# Patient Record
Sex: MALE | Race: WHITE | Employment: FULL TIME | URBAN - METROPOLITAN AREA
[De-identification: names, ages, dates, MRNs, and addresses within clinical notes are randomized per-mention and may not be internally consistent; named-entity substitution may affect disease eponyms.]

---

## 2019-05-22 ENCOUNTER — APPOINTMENT (OUTPATIENT)
Dept: GENERAL RADIOLOGY | Age: 49
End: 2019-05-22

## 2019-05-22 ENCOUNTER — HOSPITAL ENCOUNTER (EMERGENCY)
Age: 49
Discharge: HOME | End: 2019-05-23
Attending: EMERGENCY MEDICINE

## 2019-05-22 VITALS
DIASTOLIC BLOOD PRESSURE: 79 MMHG | WEIGHT: 150 LBS | OXYGEN SATURATION: 97 % | TEMPERATURE: 98 F | HEART RATE: 87 BPM | RESPIRATION RATE: 18 BRPM | HEIGHT: 67 IN | BODY MASS INDEX: 23.54 KG/M2 | SYSTOLIC BLOOD PRESSURE: 127 MMHG

## 2019-05-22 DIAGNOSIS — M25.561 ACUTE PAIN OF RIGHT KNEE: ICD-10-CM

## 2019-05-22 DIAGNOSIS — S81.811A LACERATION OF RIGHT LOWER EXTREMITY, INITIAL ENCOUNTER: Primary | ICD-10-CM

## 2019-05-22 PROCEDURE — 73562 X-RAY EXAM OF KNEE 3: CPT

## 2019-05-22 PROCEDURE — 6360000002 HC RX W HCPCS: Performed by: NURSE PRACTITIONER

## 2019-05-22 PROCEDURE — 90715 TDAP VACCINE 7 YRS/> IM: CPT | Performed by: NURSE PRACTITIONER

## 2019-05-22 PROCEDURE — 99283 EMERGENCY DEPT VISIT LOW MDM: CPT

## 2019-05-22 PROCEDURE — 90471 IMMUNIZATION ADMIN: CPT | Performed by: NURSE PRACTITIONER

## 2019-05-22 RX ADMIN — TETANUS TOXOID, REDUCED DIPHTHERIA TOXOID AND ACELLULAR PERTUSSIS VACCINE, ADSORBED 0.5 ML: 5; 2.5; 8; 8; 2.5 SUSPENSION INTRAMUSCULAR at 23:00

## 2019-05-22 ASSESSMENT — PAIN SCALES - GENERAL: PAINLEVEL_OUTOF10: 10

## 2019-05-23 PROCEDURE — 6370000000 HC RX 637 (ALT 250 FOR IP): Performed by: NURSE PRACTITIONER

## 2019-05-23 RX ORDER — HYDROCODONE BITARTRATE AND ACETAMINOPHEN 5; 325 MG/1; MG/1
1 TABLET ORAL EVERY 6 HOURS PRN
Qty: 12 TABLET | Refills: 0 | Status: SHIPPED | OUTPATIENT
Start: 2019-05-23 | End: 2019-05-26

## 2019-05-23 RX ORDER — DOXYCYCLINE 100 MG/1
100 CAPSULE ORAL ONCE
Status: COMPLETED | OUTPATIENT
Start: 2019-05-23 | End: 2019-05-23

## 2019-05-23 RX ORDER — HYDROCODONE BITARTRATE AND ACETAMINOPHEN 5; 325 MG/1; MG/1
1 TABLET ORAL ONCE
Status: COMPLETED | OUTPATIENT
Start: 2019-05-23 | End: 2019-05-23

## 2019-05-23 RX ORDER — DOXYCYCLINE HYCLATE 100 MG/1
100 CAPSULE ORAL 2 TIMES DAILY
Qty: 14 CAPSULE | Refills: 0 | Status: SHIPPED | OUTPATIENT
Start: 2019-05-23 | End: 2019-06-02

## 2019-05-23 RX ADMIN — HYDROCODONE BITARTRATE AND ACETAMINOPHEN 1 TABLET: 5; 325 TABLET ORAL at 00:42

## 2019-05-23 RX ADMIN — DOXYCYCLINE 100 MG: 100 CAPSULE ORAL at 00:42

## 2019-05-23 ASSESSMENT — PAIN SCALES - GENERAL: PAINLEVEL_OUTOF10: 10

## 2019-05-23 NOTE — ED NOTES
Patient education flyer \"[facility] What [facility] to [facility]\" was provided and reviewed. Questions answered and understanding was verbalized by the patient and/or family.        Nikita Soto RN  05/23/19 9865

## 2019-05-23 NOTE — ED NOTES
Crutch teaching complete, pt showed return demonstration. Told pt he needs to keep ice on right knee.       Audra Short RN  05/23/19 5568

## 2019-05-23 NOTE — ED PROVIDER NOTES
[facility] ED  eMERGENCY dEPARTMENT eNCOUnter      Pt Name: Hernesto Saez  MRN: [id number]  [place] 1970  Date of evaluation: 5/22/2019  Provider: GITA Artis [id number]       Chief Complaint   Patient presents with    Leg Injury         HISTORY OF PRESENT ILLNESS  (Location/Symptom, Timing/Onset, Context/Setting, Quality, Duration, Modifying Factors, Severity.)   Hernesto Saez is a 53 y.o. male who presents to the emergency department with pain and a small laceration to the right lateral knee. He states that he was coming down branches using a machete and he struck his right leg. This occurred at 5 PM.  He initially did not think much of the injury. Over the last couple hours the pain is increased and the swelling on the side of his knee has also increased. There is a small amount of swelling which is very tender to palpation. Extension of the leg increases the pain. No alleviating factors. His last tetanus was over 10 years ago. Nursing Notes were reviewed. ALLERGIES     Patient has no known allergies. CURRENT MEDICATIONS       Discharge Medication List as of 5/23/2019 12:30 AM          PAST MEDICAL HISTORY         Diagnosis Date    Chronic back pain        SURGICAL HISTORY           Procedure Laterality Date    KIDNEY REMOVAL Left     donated         FAMILY HISTORY     No family history on file. No family status information on file. SOCIAL HISTORY      reports that he has been smoking cigarettes. He has been smoking about 0.75 packs per day. He has never used smokeless tobacco. He reports that he drinks alcohol. He reports that he has current or past drug history. Drug: Cocaine.     REVIEW OF SYSTEMS    (2-9 systems for level 4, 10 or more for level 5)   Denies chest pain  Denies shortness of breath  Denies fever, chills  Denies tingling or loss of sensation at injury site    Except as noted above the remainder of the review of systems was reviewed and negative. PHYSICAL EXAM    (up to 7 for level 4, 8 or more for level 5)     ED Triage Vitals [05/22/19 2123]   BP Temp Temp src Pulse Resp SpO2 Height Weight   127/79 98 °F (36.7 °C) -- 87 18 97 % 5' 7\" (1.702 m) 150 lb (68 kg)         General Appearance:  Alert, cooperative,    Head:  Normocephalic,     Eyes:  conjunctiva/corneas clear, EOM's intact. ENT: Mucous membranes moist.      Neck: Supple,  trachea midline. Lungs:   Lungs clear to auscultation. Christel Congo Respirations eupneic   Heart: RRR, Normal skin color   Extremities:   Small nonbleeding lacertation to right outer knee, approx 1cm long. . Complains of pain with extension of the leg. Mild swelling around the wound. Mild erythema at the wound site;. No pain with valgus or varus movement. Unable to perform posterior or anterior drawer signs due to pain with flexion. No sensory loss distally. Pulses:  DP/PT pulses 3+/4   Skin: No rashes or lesions to exposed skin   Neurologic: Alert and oriented. Motor grossly normal. Speech clear       DIAGNOSTIC RESULTS     EKG: All EKG's are interpreted by the Emergency Department Physician who either signs or Co-signs this chart in the absence of a cardiologist.    N/A    RADIOLOGY:   Non-plain film images such as CT, Ultrasound and MRI are read by the radiologist. Plain radiographic images are visualized and preliminarily interpreted by the emergency physician with the below findings:         Interpretation per the Radiologist below, if available at the time of this note:    XR KNEE RIGHT (3 VIEWS)   Final Result   1. Unremarkable radiographs of the right knee. LABS:  Labs Reviewed - No data to display    All other labs were within normal range or not returned as of this dictation.     EMERGENCY DEPARTMENT COURSE and DIFFERENTIAL DIAGNOSIS/MDM:   Vitals:    Vitals:    05/22/19 2123   BP: 127/79   Pulse: 87   Resp: 18   Temp: 98 °F (36.7 °C)   SpO2: 97%   Weight: 150 lb (68 kg)   Height: 5' 7\" (1.702 m)       Medical Decision Makinyo male with laceration to the right outer knee. The wound is small and not bleeding. No need for sutures. His tetanus was updated. Xrays were negative. He will be placed on antibiotics and he was given resources to find a PCP. He has been advised to return to the ER if worse. CONSULTS:  None    PROCEDURES:  None    FINAL IMPRESSION      1. Laceration of right lower extremity, initial encounter    2. Acute pain of right knee          DISPOSITION/PLAN   DISPOSITION Decision To Discharge 2019 12:24:14 AM      PATIENT REFERRED TO:   [facility] ED  [address] [facility]  [phone]  Go to   If symptoms worsen, As needed      DISCHARGE MEDICATIONS:     Discharge Medication List as of 2019 12:30 AM      START taking these medications    Details   HYDROcodone-acetaminophen (NORCO) 5-325 MG per tablet Take 1 tablet by mouth every 6 hours as needed for Pain for up to 3 days. Intended supply: 3 days.  Take lowest dose possible to manage pain, Disp-12 tablet, R-0Print      doxycycline hyclate (VIBRAMYCIN) 100 MG capsule Take 1 capsule by mouth 2 times daily for 10 days, Disp-14 capsule, R-0Print                 (Please note that portions of this note were completed with a voice recognition program.  Efforts were made to edit the dictations but occasionally words are mis-transcribed.)    GITA Leal - CNP  Certified Nurse Practitioner          GITA Leal CNP  [id number] GITA Stark CNP  [phone]

## 2021-05-11 ENCOUNTER — HOSPITAL ENCOUNTER (EMERGENCY)
Age: 51
Discharge: HOME | End: 2021-05-11
Attending: EMERGENCY MEDICINE

## 2021-05-11 VITALS
SYSTOLIC BLOOD PRESSURE: 148 MMHG | TEMPERATURE: 98.4 F | BODY MASS INDEX: 25.06 KG/M2 | DIASTOLIC BLOOD PRESSURE: 73 MMHG | HEART RATE: 71 BPM | WEIGHT: 160 LBS | OXYGEN SATURATION: 98 % | RESPIRATION RATE: 18 BRPM

## 2021-05-11 DIAGNOSIS — T15.02XA FOREIGN BODY OF LEFT CORNEA, INITIAL ENCOUNTER: Primary | ICD-10-CM

## 2021-05-11 PROCEDURE — 99284 EMERGENCY DEPT VISIT MOD MDM: CPT

## 2021-05-11 PROCEDURE — 65220 REMOVE FOREIGN BODY FROM EYE: CPT

## 2021-05-11 RX ORDER — ERYTHROMYCIN 5 MG/G
OINTMENT OPHTHALMIC
Qty: 1 TUBE | Refills: 0 | Status: SHIPPED | OUTPATIENT
Start: 2021-05-11

## 2021-05-11 ASSESSMENT — VISUAL ACUITY: OD: 20/40

## 2021-05-11 ASSESSMENT — PAIN DESCRIPTION - DESCRIPTORS: DESCRIPTORS: BURNING;ACHING

## 2021-05-11 ASSESSMENT — PAIN DESCRIPTION - ONSET: ONSET: UNABLE TO TELL

## 2021-05-11 ASSESSMENT — PAIN DESCRIPTION - FREQUENCY
FREQUENCY: INTERMITTENT
FREQUENCY: CONTINUOUS

## 2021-05-11 ASSESSMENT — PAIN SCALES - GENERAL
PAINLEVEL_OUTOF10: 10
PAINLEVEL_OUTOF10: 6

## 2021-05-11 ASSESSMENT — PAIN DESCRIPTION - ORIENTATION: ORIENTATION: LEFT

## 2021-05-12 ASSESSMENT — VISUAL ACUITY: OU: 1

## 2021-05-12 NOTE — ED NOTES
Pt arrives [facility] pain in both eyes. Pt states he was cutting metal tube around noon yesterday when a spark/metal flew between safety glasses and eyes. Pt states he thinks the spark/metal grazed both eyes but states pain is worse in the L eye.       Pansy Severance, RN  05/11/21 2133       Pansy Severance, RN  05/11/21 2136

## 2021-05-12 NOTE — ED PROVIDER NOTES
EMERGENCY DEPARTMENT ENCOUNTER    Pt Name: Augie Cash  MRN: [id number]  [place] 1970  Date of evaluation: 5/12/21  CHIEF COMPLAINT       Chief Complaint   Patient presents with    Eye Injury     left     HISTORY OF PRESENT ILLNESS   Patient is a 67-year-old male who presents the ED complaining of eye pain. Pain started yesterday while eating through metal.  There was dunbar and fragments flying in the air. He believes he has metal fragments in his left eye. No vision changes. No other issues at this time. No fever, cough, shortness of breath, chest pain, abdominal pain. REVIEW OF SYSTEMS     Review of Systems   All other systems reviewed and are negative. PASTMEDICAL HISTORY     Past Medical History:   Diagnosis Date    Chronic back pain      SURGICAL HISTORY       Past Surgical History:   Procedure Laterality Date    KIDNEY REMOVAL Left     donated     CURRENT MEDICATIONS       Discharge Medication List as of 5/11/2021 10:21 PM        ALLERGIES     has No Known Allergies. FAMILY HISTORY     has no family status information on file. SOCIAL HISTORY       Social History     Tobacco Use    Smoking status: Current Every Day Smoker     Packs/day: 0.75     Types: Cigarettes    Smokeless tobacco: Never Used   Substance Use Topics    Alcohol use: Yes     Comment: socially    Drug use: Yes     Types: Cocaine     Comment: clean since [address]: BP (!) 148/73   Pulse 71   Temp 98.4 °F (36.9 °C) (Oral)   Resp 18   Wt 160 lb (72.6 kg)   SpO2 98%   BMI 25.06 kg/m²    Physical Exam  HENT:      Head: Normocephalic. Right Ear: External ear normal.      Left Ear: External ear normal.      Nose: Nose normal.   Eyes:      General: Lids are normal. Vision grossly intact. Gaze aligned appropriately. Right eye: No foreign body. Left eye: Foreign body present. Conjunctiva/sclera:      Right eye: Right conjunctiva is injected.       Left eye: Left conjunctiva is injected. Pupils: Pupils are equal, round, and reactive to light. Cardiovascular:      Rate and Rhythm: Normal rate. Pulmonary:      Effort: Pulmonary effort is normal.   Abdominal:      General: Abdomen is flat. Skin:     General: Skin is dry. Neurological:      Mental Status: He is alert. Mental status is at baseline. Psychiatric:         Mood and Affect: Mood normal.         Behavior: Behavior normal.         MEDICAL DECISION MAKING:   The patient is hemodynamically stable, afebrile, nontoxic-appearing. Physical exam notable for 2 small metal appearing fragments and left eye. ED plan for attempt remove foreign objects. DIAGNOSTIC RESULTS   EKG:All EKG's are interpreted by the Emergency Department Physician who either signs or Co-signs this chart in the absence of a cardiologist.        RADIOLOGY:All plain film, CT, MRI, and formal ultrasound images (except ED bedside ultrasound) are read by the radiologist, see reports below, unless otherwisenoted in MDM or here. No orders to display     LABS: All lab results were reviewed by myself, and all abnormals are listed below. Labs Reviewed - No data to display    EMERGENCY DEPARTMENTCOURSE:   Patient did well in the ED. Using tetracaine, eyes were anesthetized. No foreign object in right eye. Most medial foreign object removed from left eye with Q-tip. Foreign object identified by physician over iris unable to be removed. Will prescribe erythromycin ointment and will follow up with ophthalmology    Vitals:    Vitals:    05/11/21 2042   BP: (!) 148/73   Pulse: 71   Resp: 18   Temp: 98.4 °F (36.9 °C)   TempSrc: Oral   SpO2: 98%   Weight: 160 lb (72.6 kg)       The patient was given the following medications while in the emergency department:  Orders Placed This Encounter   Medications    erythromycin (ROMYCIN) 5 MG/GM ophthalmic ointment     Sig: Half inch to left eye four times daily for 7 days.   Dispense one 3.5 gram tube.     Dispense:  1 Tube     Refill:  0     CONSULTS:  None    FINAL IMPRESSION      1. Foreign body of left cornea, initial encounter          DISPOSITION/PLAN   DISPOSITION Decision To Discharge 05/11/2021 10:13:00 PM      PATIENT REFERRED TO:  Sp Royal MD  [address] [place] [id number]  [phone]    In 1 day      DISCHARGE MEDICATIONS:  Discharge Medication List as of 5/11/2021 10:21 PM      START taking these medications    Details   erythromycin (ROMYCIN) 5 MG/GM ophthalmic ointment Half inch to left eye four times daily for 7 days. Dispense one 3.5 gram tube. , Disp-1 Tube, R-0, Print           Clara Chong MD  Attending Emergency Physician                    Julee Barroso MD  05/12/21 3855

## 2023-06-04 ENCOUNTER — HOSPITAL ENCOUNTER (EMERGENCY)
Age: 53
Discharge: HOME | End: 2023-06-04
Attending: EMERGENCY MEDICINE

## 2023-06-04 ENCOUNTER — APPOINTMENT (OUTPATIENT)
Dept: CT IMAGING | Age: 53
End: 2023-06-04

## 2023-06-04 VITALS
WEIGHT: 155 LBS | HEART RATE: 83 BPM | HEIGHT: 67 IN | TEMPERATURE: 98.3 F | DIASTOLIC BLOOD PRESSURE: 78 MMHG | OXYGEN SATURATION: 95 % | RESPIRATION RATE: 17 BRPM | BODY MASS INDEX: 24.33 KG/M2 | SYSTOLIC BLOOD PRESSURE: 139 MMHG

## 2023-06-04 DIAGNOSIS — R10.9 ABDOMINAL PAIN, UNSPECIFIED ABDOMINAL LOCATION: ICD-10-CM

## 2023-06-04 DIAGNOSIS — K59.00 CONSTIPATION, UNSPECIFIED CONSTIPATION TYPE: Primary | ICD-10-CM

## 2023-06-04 LAB
ALBUMIN SERPL-MCNC: 4.3 G/DL (ref 3.5–5.2)
ALP SERPL-CCNC: 61 U/L (ref 40–129)
ALT SERPL-CCNC: 16 U/L (ref 5–41)
ANION GAP SERPL CALCULATED.3IONS-SCNC: 10 MMOL/L (ref 9–17)
AST SERPL-CCNC: 15 U/L
BASOPHILS # BLD: 0.04 K/UL (ref 0–0.2)
BASOPHILS NFR BLD: 0 % (ref 0–2)
BILIRUB SERPL-MCNC: 0.2 MG/DL (ref 0.3–1.2)
BILIRUB UR QL STRIP: NEGATIVE
BUN SERPL-MCNC: 18 MG/DL (ref 6–20)
BUN/CREAT SERPL: 18 (ref 9–20)
CALCIUM SERPL-MCNC: 9.1 MG/DL (ref 8.6–10.4)
CHLORIDE SERPL-SCNC: 103 MMOL/L (ref 98–107)
CLARITY UR: CLEAR
CO2 SERPL-SCNC: 25 MMOL/L (ref 20–31)
COLOR UR: YELLOW
CREAT SERPL-MCNC: 1 MG/DL (ref 0.7–1.2)
EOSINOPHIL # BLD: 0.22 K/UL (ref 0–0.44)
EOSINOPHILS RELATIVE PERCENT: 2 % (ref 1–4)
ERYTHROCYTE [DISTWIDTH] IN BLOOD BY AUTOMATED COUNT: 13.1 % (ref 11.8–14.4)
GFR SERPL CREATININE-BSD FRML MDRD: >60 ML/MIN/1.73M2
GLUCOSE SERPL-MCNC: 114 MG/DL (ref 70–99)
GLUCOSE UR STRIP-MCNC: NEGATIVE MG/DL
HCT VFR BLD AUTO: 40.4 % (ref 40.7–50.3)
HGB BLD-MCNC: 13.5 G/DL (ref 13–17)
HGB UR QL STRIP.AUTO: NEGATIVE
IMM GRANULOCYTES # BLD AUTO: 0.04 K/UL (ref 0–0.3)
IMM GRANULOCYTES NFR BLD: 0 %
KETONES UR STRIP-MCNC: NEGATIVE MG/DL
LACTATE BLDV-SCNC: 0.7 MMOL/L (ref 0.5–2.2)
LEUKOCYTE ESTERASE UR QL STRIP: NEGATIVE
LYMPHOCYTES # BLD: 25 % (ref 24–43)
LYMPHOCYTES NFR BLD: 3.2 K/UL (ref 1.1–3.7)
MCH RBC QN AUTO: 30.9 PG (ref 25.2–33.5)
MCHC RBC AUTO-ENTMCNC: 33.4 G/DL (ref 28.4–34.8)
MCV RBC AUTO: 92.4 FL (ref 82.6–102.9)
MONOCYTES NFR BLD: 0.85 K/UL (ref 0.1–1.2)
MONOCYTES NFR BLD: 7 % (ref 3–12)
NEUTROPHILS NFR BLD: 66 % (ref 36–65)
NEUTS SEG NFR BLD: 8.57 K/UL (ref 1.5–8.1)
NITRITE UR QL STRIP: NEGATIVE
NRBC AUTOMATED: 0 PER 100 WBC
PH UR STRIP: 6.5 [PH] (ref 5–8)
PLATELET # BLD AUTO: 316 K/UL (ref 138–453)
PMV BLD AUTO: 8.7 FL (ref 8.1–13.5)
POTASSIUM SERPL-SCNC: 3.8 MMOL/L (ref 3.7–5.3)
PROT SERPL-MCNC: 7.1 G/DL (ref 6.4–8.3)
PROT UR STRIP-MCNC: NEGATIVE MG/DL
RBC # BLD AUTO: 4.37 M/UL (ref 4.21–5.77)
SODIUM SERPL-SCNC: 138 MMOL/L (ref 135–144)
SP GR UR STRIP: 1.02 (ref 1–1.03)
UROBILINOGEN UR STRIP-ACNC: NORMAL
WBC OTHER # BLD: 12.9 K/UL (ref 3.5–11.3)

## 2023-06-04 PROCEDURE — 80053 COMPREHEN METABOLIC PANEL: CPT

## 2023-06-04 PROCEDURE — 36415 COLL VENOUS BLD VENIPUNCTURE: CPT

## 2023-06-04 PROCEDURE — 99284 EMERGENCY DEPT VISIT MOD MDM: CPT

## 2023-06-04 PROCEDURE — 85027 COMPLETE CBC AUTOMATED: CPT

## 2023-06-04 PROCEDURE — 81003 URINALYSIS AUTO W/O SCOPE: CPT

## 2023-06-04 PROCEDURE — 74176 CT ABD & PELVIS W/O CONTRAST: CPT

## 2023-06-04 PROCEDURE — 83605 ASSAY OF LACTIC ACID: CPT

## 2023-06-04 RX ORDER — DOCUSATE SODIUM 100 MG/1
100 CAPSULE, LIQUID FILLED ORAL 2 TIMES DAILY
Qty: 60 CAPSULE | Refills: 0 | Status: SHIPPED | OUTPATIENT
Start: 2023-06-04

## 2023-06-04 RX ORDER — POLYETHYLENE GLYCOL 3350 17 G/17G
17 POWDER, FOR SOLUTION ORAL DAILY PRN
Qty: 30 EACH | Refills: 0 | Status: SHIPPED | OUTPATIENT
Start: 2023-06-04 | End: 2023-07-04

## 2023-06-04 ASSESSMENT — PAIN DESCRIPTION - LOCATION: LOCATION: ABDOMEN

## 2023-06-04 ASSESSMENT — PAIN SCALES - GENERAL: PAINLEVEL_OUTOF10: 5

## 2023-06-04 ASSESSMENT — PAIN - FUNCTIONAL ASSESSMENT: PAIN_FUNCTIONAL_ASSESSMENT: 0-10

## 2023-06-04 ASSESSMENT — PAIN DESCRIPTION - ORIENTATION: ORIENTATION: LEFT

## 2023-06-05 NOTE — ED NOTES
Pt came into ED complaining of left lower quad abdominal pain for the past 3xmonths. Pt states that he is experiencing constipation last bowel movement today,after pt used a enema. Pt has a history of a left sided hernia. Pt is alert and orientedx4. Pt is able to ambulate without assistance. Pt denies fever, SOB, or chest pain. Pt vitals are stable.      Denny Dickerson RN  06/04/23 2042

## 2023-06-07 ASSESSMENT — ENCOUNTER SYMPTOMS
ABDOMINAL PAIN: 1
CONSTIPATION: 1

## 2023-06-07 NOTE — ED PROVIDER NOTES
[address] [place] [address] ED  Emergency Department  Emergency Medicine     Care of Steffanie Wharton was assumed from previous provider and is being seen for Constipation (Last BM today; reports used an enema today to be able to have BM) and Abdominal Pain (Reports left sided abd pain, reports been on going intermittently for 3 weeks; reports has a hernia on the left sided, concerned that it is causing his constipation issue)  . The patient's initial evaluation and plan have been discussed with the prior provider who initially evaluated the patient. EMERGENCY DEPARTMENT COURSE / MEDICAL DECISION MAKING:       MEDICATIONS GIVEN:  Orders Placed This Encounter   Medications    polyethylene glycol (MIRALAX) 17 g packet     Sig: Take 17 g by mouth daily as needed for Constipation     Dispense:  30 each     Refill:  0    docusate sodium (COLACE) 100 MG capsule     Sig: Take 1 capsule by mouth 2 times daily     Dispense:  60 capsule     Refill:  0       LABS / RADIOLOGY:     Labs Reviewed   CBC WITH AUTO DIFFERENTIAL - Abnormal; Notable for the following components:       Result Value    WBC 12.9 (*)     Hematocrit 40.4 (*)     Seg Neutrophils 66 (*)     Segs Absolute 8.57 (*)     All other components within normal limits   COMPREHENSIVE METABOLIC PANEL - Abnormal; Notable for the following components:    Glucose 114 (*)     Total Bilirubin 0.2 (*)     All other components within normal limits   LACTIC ACID   URINALYSIS WITH REFLEX TO CULTURE       CT ABDOMEN PELVIS WO CONTRAST Additional Contrast? None    Result Date: 6/4/2023  EXAMINATION: CT OF THE ABDOMEN AND PELVIS WITHOUT CONTRAST 6/4/2023 8:30 pm TECHNIQUE: CT of the abdomen and pelvis was performed without the administration of intravenous contrast. Multiplanar reformatted images are provided for review.  Automated exposure control, iterative reconstruction, and/or weight based adjustment of the mA/kV was utilized to reduce the radiation dose to as low as reasonably
(70.3 kg)   SpO2 95%   BMI 24.28 kg/m²    Physical Exam  Constitutional:       General: He is not in acute distress. Appearance: He is well-developed. HENT:      Head: Normocephalic. Eyes:      Pupils: Pupils are equal, round, and reactive to light. Cardiovascular:      Rate and Rhythm: Normal rate and regular rhythm. Heart sounds: Normal heart sounds. Pulmonary:      Effort: Pulmonary effort is normal. No respiratory distress. Breath sounds: Normal breath sounds. Abdominal:      General: Bowel sounds are normal.      Palpations: Abdomen is soft. Tenderness: There is no abdominal tenderness. Musculoskeletal:         General: Normal range of motion. Skin:     General: Skin is warm and dry. Neurological:      Mental Status: He is alert and oriented to person, place, and time. MEDICAL DECISION MAKING / ED COURSE:   Summary of Patient Presentation:        1)  Number and Complexity of Problems  Problem List This Visit: Abdominal pain, constipation    Differential Diagnosis: Patient has soft benign abdominal exam.  Bowel sounds are auscultated. There are no peritoneal signs. Differential considered: constipation, biliary colic, acute cholecystitis/cholangitis, hepatitis, infectious process, portal vein thrombosis, AAA, vascular disease such as arterial compression or ischemia or infarction     Diagnoses Considered but Do Not Suspect:  biliary colic, acute cholecystitis/cholangitis, hepatitis, infectious process, portal vein thrombosis, AAA, vascular disease such as arterial compression or ischemia or infarction     Pertinent Comorbid Conditions:  SP left kidney removal    2)  Data Reviewed      See more data below for the lab and radiology tests and orders. 3)  Treatment and Disposition    Alert oriented nondistressed 52-year-old male presented to the emergency room for generalized abdominal discomfort ongoing over the last 2 to 3 weeks.   Pain seems to be more in the left

## 2024-09-18 NOTE — ED NOTES
Keep his scheduled appointment  Labs ordered for him. Pt presents to the ED for right knee pain. Pt was cutting trees in back yard and the saw came down and hit outside of right knee. Pt states the saw went into his skin and hit his bone. Bleeding is stopped. Laceration is less than half an inch. Pt is ambulatory. Pt rates pain a 10/10.       Cassie Orosco RN  05/22/19 9918